# Patient Record
(demographics unavailable — no encounter records)

---

## 2025-05-14 NOTE — REASON FOR VISIT
[Annual] : an annual visit. [TextEntry] : ANNUAL EXAM.  MIRENA PLACED IN 2019.  NO MENSES W MIRENA.  C/O WGT GAIN.  FEELING MORE EMOTIONAL. SLEEPING WELL BUT WAS HAVING DIFFIUCLTY SLEEPING IN PAST.  MINIMAL HOT FLASHES.  DENIES VAG DRYNESS REPORTS URGE INCONTINENCE

## 2025-05-14 NOTE — PLAN
[FreeTextEntry1] : DISCUSSED OPTIONS OF SSRI VS HRT.  WOULD LIKELY OBTAIN GREATER BENEFIT FROM SSRI.  WOULD KEEP MIRENA NOW IN CASE HRT WOULD BE NEEDED AT LATER DATE.  CAN F/U AS RELEHEALTH TO MONITOR RESPONSE TO TREATMENT.  ALL QUESTIONS ANSWERED DECLINES UROGYDIEGO COOL;  ANABEL PELVIC PT REFER TO DR MORENO FOR COLONOSCOPY  Patient screened for depression- no signs of clinical depression.  PHQ-9 scores reviewed over the course of the visit  5-10 minutes of face to face time spent.  Follow up with changes in mood including other symptoms of anxiety.